# Patient Record
(demographics unavailable — no encounter records)

---

## 2017-07-31 NOTE — EMERGENCY DEPARTMENT REPORT
ED Fall HPI





- General


Chief Complaint: Extremity Injury, Upper


Stated Complaint: RIGHT SHOULDER PAIN


Time Seen by Provider: 07/31/17 20:42


Source: patient


Mode of arrival: Ambulatory


Limitations: No Limitations





- History of Present Illness


MD Complaint: fall


-: This evening


Fall From: standing


When Fall Occurred: just prior to arrival


Fall Witnessed: yes, by bystander


Place Fall Occurred: other ([facility])


Loss of Consciousness: none


Symptoms Prior to Fall: none


Location - Extremities: Right: Shoulder


Severity: severe


Severity scale (0 -10): 9


Quality: sharp


Context: tripped/slipped


Associated Symptoms: denies: headache, neck pain, numbness, weakness, chest 

paint, shortness of breath, abdominal pain, hematuria, unable to walk, 

lightheaded, vertigo, confusion





- Related Data


 Home Medications











 Medication  Instructions  Recorded  Confirmed  Last Taken


 


ALBUTEROL Inhaler [ProAir HFA 1 inh PO DAILY PRN 07/31/17 07/31/17 Unknown





Inhaler]    


 


Cyclobenzaprine HCl [Flexeril 5 MG 5 mg PO DAILY 07/31/17 07/31/17 Unknown





TAB]    


 


FLUoxetine [PROzac] 20 mg PO QDAY 07/31/17 07/31/17 Unknown


 


Fluticasone [Flonase] 1 spray NS QDAY 07/31/17 07/31/17 Unknown


 


Furosemide [Lasix TAB] 40 mg PO QDAY 07/31/17 07/31/17 Unknown


 


Hydroxychloroquine [Plaquenil] 200 mg PO BID 07/31/17 07/31/17 Unknown


 


Ketoprofen 50 mg PO BID 07/31/17 07/31/17 Unknown


 


Loratadine [Claritin] 10 mg PO DAILY 07/31/17 07/31/17 Unknown


 


Metoprolol [Lopressor TAB] 50 mg PO BID 07/31/17 07/31/17 Unknown


 


Montelukast [Singulair] 10 mg PO QPM 07/31/17 07/31/17 Unknown


 


Omeprazole Magnesium [PriLOSEC Otc] 20 mg PO 3XW 07/31/17 07/31/17 Unknown


 


sulfaSALAzine [Azulfidine] 750 mg PO DAILY 07/31/17 07/31/17 Unknown











 Allergies











Allergy/AdvReac Type Severity Reaction Status Date / Time


 


lisinopril Allergy  Unknown Verified 07/31/17 19:31














ED Review of Systems


ROS: 


Stated complaint: RIGHT SHOULDER PAIN


Other details as noted in HPI





Comment: All other systems reviewed and negative


Constitutional: denies: chills, fever


ENT: denies: ear pain


Respiratory: denies: cough, shortness of breath


Cardiovascular: denies: chest pain, palpitations


Gastrointestinal: denies: abdominal pain, nausea, vomiting, hematemesis


Genitourinary: denies: hematuria


Skin: denies: lesions


Neurological: denies: headache, weakness, numbness, paresthesias, confusion, 

abnormal gait, vertigo





ED Past Medical Hx





- Past Medical History


Hx Hypertension: Yes


Hx Diabetes: Yes


Hx GERD: Yes


Hx Asthma: Yes


Additional medical history: sjogren's syndrome,sleep apnea with C-PAP,

polycystic Ovarian,benign breast disease,fibromyalgia,RA,cardiomyopathy,AFIB





- Surgical History


Additional Surgical History: tonsillectomy,umbilical hernia,carpal tunnel 

syndrome. FX front upper jaw,left wrist,pelvis,bilateral ft





- Social History


Smoking Status: Never Smoker


Substance Use Type: None





- Medications


Home Medications: 


 Home Medications











 Medication  Instructions  Recorded  Confirmed  Last Taken  Type


 


ALBUTEROL Inhaler [ProAir HFA 1 inh PO DAILY PRN 07/31/17 07/31/17 Unknown 

History





Inhaler]     


 


Cyclobenzaprine HCl [Flexeril 5 MG 5 mg PO DAILY 07/31/17 07/31/17 Unknown 

History





TAB]     


 


FLUoxetine [PROzac] 20 mg PO QDAY 07/31/17 07/31/17 Unknown History


 


Fluticasone [Flonase] 1 spray NS QDAY 07/31/17 07/31/17 Unknown History


 


Furosemide [Lasix TAB] 40 mg PO QDAY 07/31/17 07/31/17 Unknown History


 


Hydroxychloroquine [Plaquenil] 200 mg PO BID 07/31/17 07/31/17 Unknown History


 


Ketoprofen 50 mg PO BID 07/31/17 07/31/17 Unknown History


 


Loratadine [Claritin] 10 mg PO DAILY 07/31/17 07/31/17 Unknown History


 


Metoprolol [Lopressor TAB] 50 mg PO BID 07/31/17 07/31/17 Unknown History


 


Montelukast [Singulair] 10 mg PO QPM 07/31/17 07/31/17 Unknown History


 


Omeprazole Magnesium [PriLOSEC Otc] 20 mg PO 3XW 07/31/17 07/31/17 Unknown 

History


 


sulfaSALAzine [Azulfidine] 750 mg PO DAILY 07/31/17 07/31/17 Unknown History














ED Physical Exam





- General


Limitations: Physical Limitation


General appearance: alert, in distress (secondary to pain)





- Head


Head exam: Present: atraumatic, normal inspection





- Eye


Eye exam: Present: normal appearance


Pupils: Present: normal accommodation





- ENT


ENT exam: Present: normal exam





- Neck


Neck exam: Present: normal inspection.  Absent: tenderness, meningismus, full 

ROM





- Respiratory


Respiratory exam: Present: normal lung sounds bilaterally.  Absent: respiratory 

distress, wheezes, rhonchi





- Cardiovascular


Cardiovascular Exam: Present: regular rate, normal rhythm, normal heart sounds





- GI/Abdominal


GI/Abdominal exam: Present: soft.  Absent: distended, tenderness, guarding, 

rebound, rigid, mass, pulsatile mass





- Expanded Upper Extremity Exam


  ** Right


Shoulder Exam: Present: tenderness, dislocation.  Absent: full ROM, swelling, 

abrasion, deformity, erythema, tenderness over AC joint


Upper Arm exam: Present: normal inspection.  Absent: tenderness


Elbow exam: Present: normal inspection, full ROM.  Absent: tenderness, 

ecchymosis, deformity


Forearm Wrist exam: Present: full ROM.  Absent: tenderness, swelling, laceration

, deformity


Hand Wrist exam: Present: normal inspection, full ROM.  Absent: tenderness, 

swelling, laceration


Neuro motor exam: Present: wrist extension intact, thumb opposition intact, 

thumb IP flexion intact, thumb adduction intact, fingers 2-5 abduction intact


Vascular: Present: normal capillary refill.  Absent: vascular compromise





- Back Exam


Back exam: Present: full ROM.  Absent: CVA tenderness (R), CVA tenderness (L)





- Neurological Exam


Neurological exam: Present: alert, oriented X3, CN II-XII intact, normal gait.  

Absent: motor sensory deficit





- Skin


Skin exam: Present: warm, intact, normal color





ED Course


 Vital Signs











  07/31/17 07/31/17 07/31/17





  19:21 20:46 21:35


 


Temperature 99 F  


 


Temperature [   98.8 F





Post-Procedure]   


 


Temperature [   98.8 F





Pre-Procedure]   


 


Pulse Rate 84  


 


Pulse Rate [   89





Intra-Procedure   





]   


 


Pulse Rate [   86





Post-Procedure]   


 


Pulse Rate [Pre   95 H





-Procedure]   


 


Respiratory 18 18 





Rate   


 


Respiratory   18





Rate [Intra-   





Procedure]   


 


Respiratory   20





Rate [Post-   





Procedure]   


 


Respiratory   22





Rate [Pre-   





Procedure]   


 


Blood Pressure 152/63  


 


Blood Pressure   164/78





[Intra-   





Procedure]   


 


Blood Pressure   130/75





[Post-Procedure   





]   


 


Blood Pressure   170/72





[Pre-Procedure]   


 


O2 Sat by Pulse 99 99 





Oximetry   


 


O2 Sat by Pulse   99





Oximetry [   





Intra-Procedure   





]   


 


O2 Sat by Pulse   99





Oximetry [Post   





-Procedure]   


 


O2 Sat by Pulse   99





Oximetry [Pre-   





Procedure]   














- Reevaluation(s)


Reevaluation #1: 





07/31/17 21:21


Discussed with Dr. Foreman who advised to go a head and reduce the shoulder.


Reevaluation #2: 





07/31/17 23:08


discuss with Dr Michel for admission. NPO after midnight for surgery in AM per 

Dr Foreman





- Moderate Sedation


ASA Class: III


Mallampati Airway Score: 3


Preparation: cardiac monitor applied, pulse oximeter, capnometry used, 

supplemental O2 applied, reversal agents at bedside, suction/airway equipment 

at bedside, IV secured


Fentanyl: IV


Midazolam: IV


Midazolam Dose: 5


IV Etomidate Dose (mgs): 10


Complications: none


Patient Tolerated Procedure: well





- Orthopedic Fracture Reduction


  ** Fracture #1


Consent Obtained: emergent situation


Time Out Performed: Yes


Side: right


Fracture Reduction Location: humerus


Analgesia: moderate sedation


Technique: traction/counter-traction


Post-Reduction Neuro Exam: intact


Post-Reduction Vascular Exam: intact


Splint Applied: Yes


Patient Tolerated Procedure: well





- Orthopedic Joint Reduction


  ** Joint #1


Consent Obtained: emergent situation


Time Out Performed: Yes


Side: right


Joint Reduction Location: shoulder


Analgesia: moderate sedation


Shoulder Technique Used (if applicable): traction/counter-traction


Technique Used: traction/counter-traction, direct manipulation


Post-Reduction Neuro Exam: intact


Post-Reduction Vascular Exam: intact


Post Reduction X-Ray Obtained: Yes


Splint Applied: Yes


Patient Tolerated Procedure: well





ED Medical Decision Making





- Lab Data


Result diagrams: 


 07/31/17 22:44





 07/31/17 22:21


Critical care attestation.: 


If time is entered above; I have spent that time in minutes in the direct care 

of this critically ill patient, excluding procedure time.








ED Disposition


Clinical Impression: 


 Humeral fracture, Shoulder dislocation





Disposition: DC-[id number] OP ADMIT IP TO THIS HOSP


Is pt being admited?: Yes


Does the pt Need Aspirin: No


Condition: Stable


Referrals: 


PRIMARY CARE,MD [Primary Care Provider] - 3-5 Days

## 2017-07-31 NOTE — HISTORY AND PHYSICAL REPORT
History of Present Illness


Chief complaint: 


I fell and hurt my arm





History of present illness: 


61 YO Female ([employer]'s Witness) with HTN, DM, GERD, ASthma, MO, AB, 

Fibromyalgia, RA, PCOS, Sjogrens, A Fib presents to ED for evaluation. Pt 

states that she tripped and experienced a fall today landing on her right 

shoulder. Pt experienced pain immediately after fall and was unable to move her 

arm due to pain. Pain was 9/10, worse with movement, relieved minimally with 

non movement. Pt seen and evaluated in ED and found to have a dislocated Right 

shoulder, and was reduced under anesthesia. Pt denies fever, chills, CP, 

Palpitations, NVD, syncope, productive cough, or recent ill contacts.  











Past History


Past Medical History: diabetes, GERD, hypertension


Past Surgical History: hysterectomy, hernia repair, tonsillectomy, Other (

carpal tunnel)


Social history: single, other ([employer])


Family history: diabetes, hypertension





Medications and Allergies


 Allergies











Allergy/AdvReac Type Severity Reaction Status Date / Time


 


lisinopril Allergy  Unknown Verified 07/31/17 19:31











 Home Medications











 Medication  Instructions  Recorded  Confirmed  Last Taken  Type


 


ALBUTEROL Inhaler [ProAir HFA 1 inh PO DAILY PRN 07/31/17 07/31/17 Unknown 

History





Inhaler]     


 


Cyclobenzaprine HCl [Flexeril 5 MG 5 mg PO DAILY 07/31/17 07/31/17 Unknown 

History





TAB]     


 


FLUoxetine [PROzac] 20 mg PO QDAY 07/31/17 07/31/17 Unknown History


 


Fluticasone [Flonase] 1 spray NS QDAY 07/31/17 07/31/17 Unknown History


 


Furosemide [Lasix TAB] 40 mg PO QDAY 07/31/17 07/31/17 Unknown History


 


Hydroxychloroquine [Plaquenil] 200 mg PO BID 07/31/17 07/31/17 Unknown History


 


Ketoprofen 50 mg PO BID 07/31/17 07/31/17 Unknown History


 


Loratadine [Claritin] 10 mg PO DAILY 07/31/17 07/31/17 Unknown History


 


Metoprolol [Lopressor TAB] 50 mg PO BID 07/31/17 07/31/17 Unknown History


 


Montelukast [Singulair] 10 mg PO QPM 07/31/17 07/31/17 Unknown History


 


Omeprazole Magnesium [PriLOSEC Otc] 20 mg PO 3XW 07/31/17 07/31/17 Unknown 

History


 


sulfaSALAzine [Azulfidine] 750 mg PO DAILY 07/31/17 07/31/17 Unknown History











Active Meds: 


Active Medications





Midazolam HCl (Versed)  2 mg IV ONCE NR


   Stop: 08/01/17 21:39


   Last Admin: 07/31/17 21:45 Dose:  2 mg











Review of Systems


All systems: negative


Constitutional: other (fall), no weight loss


Ears, nose, mouth and throat: no ear pain


Breasts: no swelling


Cardiovascular: no chest pain


Respiratory: no cough


Gastrointestinal: no abdominal pain


Genitourinary Female: no flank pain


Rectal: no pain


Musculoskeletal: no neck stiffness


Integumentary: no rash


Neurological: no head injury


Psychiatric: no anxiety


Endocrine: no cold intolerance


Hematologic/Lymphatic: no easy bruising


Allergic/Immunologic: no urticaria





Exam





- Constitutional


Vitals: 


 











Temp Pulse Resp BP Pulse Ox


 


 98.8 F   95 H  18   170/72   99 


 


 07/31/17 21:35  07/31/17 21:35  07/31/17 23:20  07/31/17 21:35  07/31/17 21:35











General appearance: Present: mild distress, obese





- EENT


Eyes: Present: PERRL


ENT: hearing intact, clear oral mucosa





- Neck


Neck: Present: supple, normal ROM





- Respiratory


Respiratory effort: normal


Respiratory: bilateral: CTA





- Cardiovascular


Rhythm: regular


Heart Sounds: Present: S1 & S2.  Absent: rub, click





- Extremities


Extremities: pulses symmetrical, No edema


Peripheral Pulses: within normal limits





- Abdominal


General gastrointestinal: Present: soft, non-tender, non-distended, normal 

bowel sounds


Female genitourinary: Present: normal





- Integumentary


Integumentary: Present: clear, warm, dry





- Musculoskeletal


Musculoskeletal: gait normal, strength equal bilaterally





- Psychiatric


Psychiatric: appropriate mood/affect, intact judgment & insight





- Neurologic


Neurologic: CNII-XII intact, moves all extremities





Results





- Labs


CBC & Chem 7: 


 07/31/17 22:44





 07/31/17 22:21


Labs: 


 Abnormal lab results











  07/31/17 07/31/17 Range/Units





  22:21 22:44 


 


WBC   14.0 H  (4.5-11.0)  K/mm3


 


Mono % (Auto)   9.5 H  (0.0-7.3)  %


 


Mono #   1.3 H  (0.0-0.8)  K/mm3


 


Seg Neutrophils #   9.6 H  (1.8-7.7)  K/mm3


 


Carbon Dioxide  21 L   (22-30)  mmol/L


 


Creatinine  0.6 L   (0.7-1.2)  mg/dL


 


Glucose  193 H   ()  mg/dL














Assessment and Plan





- Patient Problems


(1) HTN (hypertension)


Current Visit: Yes   Status: Acute   


Qualifiers: 


   Hypertension type: H 


Plan to address problem: 


monitor bp q shift, 








(2) AB (obstructive sleep apnea)


Current Visit: Yes   Status: Acute   


Plan to address problem: 


supportive care, supplemental oxygen, NIPPV as clinically indicated. 








(3) Fibromyalgia


Current Visit: Yes   Status: Acute   


Plan to address problem: 


continue current therapy, supportive care. 








(4) Humeral fracture


Current Visit: Yes   Status: Acute   


Qualifiers: 


   Encounter type: E   Humerus Location: H   Fracture type: F   Fracture 

morphology: F   Fracture alignment: F   Salter-Morris Fracture Type: S   

Laterality: L   Fracture healing: F 


Plan to address problem: 


[place] consulted, pending surgical intervention in AM








(5) Shoulder dislocation


Current Visit: Yes   Status: Acute   


Qualifiers: 


   Encounter type: E   Laterality: L 


Plan to address problem: 


Ortho consulted, S/P shoulder reduction








(6) DVT prophylaxis


Current Visit: Yes   Status: Acute

## 2017-08-01 NOTE — ADMIT CRITERIA FORM
Admission Criteria Documentation: 








               MUSCULOSKELETAL DISEASE GRG





Clinical Indications for Admission to Inpatient Care





                                                            (Place 'X' for any 

and all applicable criteria):





Hospital admission is needed for appropriate care of the patient because of 1 

or more of the following:





[X ]I.    Fracture, dislocation, or other musculoskeletal injury requiring 

inpatient care(medical) as indicated 


         by 1 or more of the following(4)(5)(6)(7)


         [ ]a)  Vertebral fracture requiring observation for instability or 

neurologic compromise (8)


         [ ]b)  Compartment syndrome (proven or cannot be ruled out during 

observation level of care) (9)


         [ ]c)  Limb-threatening injury


         [ ]d)  Major injury requiring inpatient stabilization such as traction 

initiation or external fixation 


                 before internal fixation or closure of complex or open fracture


         [X ]e)  Major injury requiring inpatient treatment after emergency or 

observation level care (as appropriate)


         [ ]f)   Severe pain requiring acute inpatient management


         [ ]g)  Injury with suspicion of abuse or neglect (eg., child, 

dependent elderly)





[ ]II.    Newly diagnosed or suspected bone, joint, or orthopedic device 

infection (e.g., osteomyelitis, septic arthritis) 


          needing 1 or more of the following(1)(2)(3)


          [ ]a)   IV antibiotics that cannot be initiated in other than 

inpatient setting (e.g., patient too unstable or


                   home infusion not available)


          [ ]b)   Device removal or replacement


          [ ]c)   Bone or soft tissue debridement


          [ ]d)   Joint drainage (drain placement or repetitive aspirations)


[ ]III.    Severe rheumatologic disease (e.g., systemic lupus erythematosus, 

rheumatoid arthritis) with complications


          or comorbidities (Also use Optimal Recovery Care Criteria or General 

Recovery Criteria as appropriate on the 


          basis of predominant condition), including 1 or more of the following(

10)(11)(12)(13)


          [ ]a)  Severe infection (e.g., CNS infection, sepsis) (14)


          [ ]b)  Respiratory complications, including 1 or more of the following

:


                  [ ]i)     Pleural effusion with respiratory compromise      


                  [ ]ii)    Pulmonary hypertension with congestive failure 


                  [ ]iii)   Respiratory failure


                  [ ]iv)   Pulmonary hemorrhage (15)


           [ ]c) Hematologic disease, including 1 or more of the following:


                 [ ]i)     Coagulopathy with bleeding        


                 [ ]ii)    Thrombosis with hypercoagulable state      


                 [ ]iii)   Thrombotic thrombocytopenic purpura 


           [ ]d) Cerebritis with seizures, psychosis, or other severe 

abnormalities


           [ ]e) Vertebral destruction with monitoring needed for cervical 

myelopathy& possible respiratory compromise


           [ ]f)  Exacerbation that requires inpatient treatment (e.g., 

intravenous immunosuppression) (16)


           [ ]g) Acute renal failure


           [ ]h) Cerebritis with seizures, psychosis, Altered mental status, or 

other neurologic abnormalities


           [ ]i) Pericardial effusion with tamponade


           [ ]j) Vertebral destruction, with monitoring needed for cervical 

myelopathy and possible respiratory compromise





[ ]IV. Severe vasculitis with complications or comorbidities (Also use Optimal 

Recovery Care Criteria 


        General Recovery Criteria as appropriate on the basis of predominant 

condition), including


        1 or more of the following(11)(12)(17)(18)(19)(20)


        [ ]a)   Exacerbation that requires inpatient treatment (e.g., 

intravenous immunosuppression) (19)(21)


        [ ]b)    Pulmonary hemorrhage (15)                                     

                                  


        [ ]c)   CNS vasculitis with seizures, psychosis, Altered mental status 

that is severe or persistent, or other severe abnormalities (22)


        [ ]d)  Cerebral infarction                                             


        [ ]e)  Gastrointestinal ischemia 


        [ ]f)   Gangrene or threatened amputation


        [ ]g)  Renal failure (16)   


        [ ]h) Other significant complications of vasculitis ( eg., tissue or 

organ ischemia, organ dysfunction )





[ ]V.  Severe myopathy as indicated by 1 or more of the following (28)(29)


        [ ]a)  New onset of airway compromise or inability to swallow


        [ ]b)  Respiratory deterioration with observation needed for impending 

respiratory failure


        [ ]c)  Exacerbation that requires inpatient treatment (e.g., 

intravenous immunosuppression) 





[ ]VI. Severe crystal gout (arthropathy) indicated by 1 or more of the 

following (23)(24)


        [ ]a)  Severe pain requiring acute inpatient management


        [ ]b)  Exacerbation that requires inpatient treatment (e.g., 

intravenous treatment) 





[ ]VII.Rhabdomyolysis and 1 or more of the following (25)(26)(27)


        [ ]a)  Acute renal failure


        [ ]b)  Need for intravenous hydration after emergency or observation 

level care (as appropriate)


        [ ]c)  Inability to maintain oral hydration


        [ ]d)  Change in mental status


        [ ]e)  Electrolyte abnormality that remains after emergency or 

observation level care (as appropriate) 





[ ]VIII Post amputation complication, as indicated by ANY ONE of the following 


         [ ]a) Infection


         [ ]b) Dehiscence


         [ ]c) Myodesis failure        





[ ]IX. Severe pain requiring acute inpatient management due to musculoskeletal 

condition


       


[ ]X.  Musculoskeletal Disease and ALL of the following:


        [ ]a)  Symptom or finding for which emergency and observation care have 

failed or are not considered 


                appropriate (Use General Criteria: Observation Care as 

appropriate)


        [ ]b)  Presence of ANY ONE of the following


               [ ]i)   A General Admission Criteria    


               [ ]ii)  A Pediatric General Admission Criteria 











The original [facility] [employer] content created by [facility] 

[employer] has been revised. 


The portions of the content which have been revised are identified through the 

use of italic text or in bold, and [facility] 


has neither reviewed nor approved the modified material. All other unmodified 

content is copyright  [facility] [employer].





Please see references footnoted in the original [facility] edition 

2016


Admission Criteria Met: Yes

## 2017-08-01 NOTE — XRAY REPORT
PORTABLE CHEST



INDICATION: Chest pain.



COMPARISON: 2/28/2011



FINDINGS: Portable, frontal chest radiograph demonstrates slight motion 

artifact with grossly stable cardiomediastinal silhouette and increased 

bronchovascular prominence centrally, given slight difference in 

inspiration.  No large pleural effusions or CHF.  EKG leads. Grossly 

stable bones.



CONCLUSION: Findings, as above.



Thank you for the opportunity to participate in this patient's care.

## 2017-08-01 NOTE — XRAY REPORT
RIGHT SHOULDER, 3 views:



History: Pain.



A comminuted fracture involving the right humeral head and neck is 

identified. No additional fracture is appreciated. Large joint effusion 

is suspected.





IMPRESSION: Right humeral head and neck fracture. Joint effusion.

## 2017-08-01 NOTE — ANESTHESIA CONSULTATION
Anesthesia Consult and Med Hx


Date of service: 08/01/17





- Airway


Anesthetic Teeth Evaluation: Good


ROM Head & Neck: Adequate


Mental/Hyoid Distance: Adequate


Mallampati Class: Class II


Intubation Access Assessment: Good





- Pulmonary Exam


CTA: Yes





- Cardiac Exam


Cardiac Exam: RRR





- Pre-Operative Health Status


ASA Pre-Surgery Classification: ASA3


Proposed Anesthetic Plan: General


Nerve Block: IS





- Pulmonary


Hx Asthma: Yes


COPD: No


Hx Pneumonia: Yes





- Cardiovascular System


Hx Hypertension: Yes





- Central Nervous System


Hx Psychiatric Problems: No





- Endocrine


Hx End Stage Renal Disease: No


Hx Non-Insulin Dependent Diabetes: Yes





- Hematic


Hx Anemia: No


Hx Sickle Cell Disease: No





- Other Systems


Hx Alcohol Use: Yes


Hx Substance Use: No


Hx Cancer: No


Hx Obesity: Yes

## 2017-08-01 NOTE — PROCEDURE NOTE
Date of procedure: 08/01/17


Pre-op diagnosis: fracture dislocation right shoulder


Post-op diagnosis: same


Procedure: 





Procedure- close reduction and insertion of intramedullary nail right humerus





Indications - exterior-year-old female who complained of right shoulder pain 

after a slip and fall injury, she was seen in the emergency room where x-rays 

were taken revealing a fracture dislocation.  Multiple attempts at closed 

reduction were unsuccessful therefore patient was admitted and being taken now 

to the or for the above procedure





Procedure


Patient was brought to the OR after being given a scalene nerve block in 

preoperative holding she was placed on the OR table in a supine position 

followed and induction and intubation by anesthesia patient was placed in a 

beachchair position with the right upper extremity draped and suspended from 

the table.  The right shoulder and arm was then prepped and draped in the usual 

sterile manner.  Timeout procedure was done to identify the patient and the.  

Using C-arm fluoroscopy a guidewire placed over the intended insertion site a 

stab wound was made on the top of the shoulder and was carried down through 

skin and subcutaneous and through the deltoid and supraspinatus tendon 

guidewire was then advanced further into the canal using C-arm fluoroscopy 

asked to guidewire was then overreamed a short IM nail was then inserted 

antegrade into the proximal humerus again under C-arm fluoroscopy 2 locking 

screws were placed proximally through the greater tuberosity and lesser 

tuberosities a third screw noted at the kickstand was used to stabilize the 

proximal and distal fragments this was then followed by 2 distally locking 

screws in AP and lateral image was obtained and showed good reduction of the 

fracture as well as the anterior dislocation following this the wound was 

copiously irrigated the incision was closed starting with the rotator cuff 

tendon deltoid fascia subcutaneous tissue and finally the skin the additional 

stab wounds for the proximal and distal locking screws were also irrigated and 

closed routine postoperative dressings were applied the patient tolerated the 

procedure there were no complications


Anesthesia: GETA


Surgeon: KEERTHI TINAJERO (first assistant Viral Gamboa)


Estimated blood loss: 50-100ml


Pathology: none


Condition: stable


Disposition: PACU

## 2017-08-01 NOTE — XRAY REPORT
RIGHT SHOULDER, ONE VIEW



History: Postreduction film.



Findings: Position and alignment of the right humeral head is unchanged 

since earlier today at 1943 hrs. There is inferior subluxation of the 

humeral head secondary to a large joint effusion/hemarthrosis but no 

dislocation. Comminuted fracture of the proximal humerus is unchanged.



Impression: No change.

## 2017-08-01 NOTE — PROGRESS NOTE
Assessment and Plan


Assessment and plan: 


61 YO Female (Jehova's Witness) with HTN, DM, GERD, ASthma, MO, AB, 

Fibromyalgia, RA, PCOS, Sjogrens, A Fib presents to ED for evaluation. Pt 

states that she tripped and experienced a fall today landing on her right 

shoulder. Pt experienced pain immediately after fall and was unable to move her 

arm due to pain. Pain was 9/10, worse with movement, relieved minimally with 

non movement. Pt seen and evaluated in ED and found to have a dislocated Right 

shoulder, and was reduced under anesthesia. Pt denies fever, chills, CP, 

Palpitations, NVD, syncope, productive cough, or recent ill contacts.  





* Intermittent fracture of the proximal humerus with inferior subluxation of 

the humeral head


 * Orthopedic surgeon following patient planned for surgery today.  Continue 

pain control.


* Hypertension


 * Continue home medication.


* Diabetes Mellitus


 * Patient on metformin at home, will start on Accu-Cheks before meals and at 

bedtime


* Fibromyalgia


 * Continue supportive care


* Morbid Obesity


 * Discussed with patient weight management options she verbalizes understanding


* Leukocytosis-resolved


* Obstructive sleep apnea


 * CPAP daily at bedtime


* DVT and GI prophylaxis


* Plan care discussed with the patient in detail











History


Interval history: 


Patient seen and examined this morning remains with pain on the right shoulder 

which is currently in a sling.  Patient was admitted with right upper extremity 

pain following a fall was noted to have a comminuted fracture of the proximal 

humerus with inferior subluxation of the humeral head.  Patient describes as 5/

10 in intensity was with movement.  Denies any fever, nausea, vomiting or 

diarrhea.








Hospitalist Physical





- Physical exam


Narrative exam: 


 VITAL SIGNS:  Reviewed.    


GENERAL:  The patient appeared well nourished and normally developed.  Morbidly 

obese, Vital signs as documented.


HEAD:  No signs of head trauma.


EYES:  Pupils are equal.  Extraocular motions intact.  


EARS:  Hearing grossly intact.


MOUTH:  Oropharynx is normal. 


NECK:  No adenopathy, no JVD.   


CHEST:  Chest with clear breath sounds bilaterally.  No wheezes, rales, or 

rhonchi.  


CARDIAC:  Regular rate and rhythm.  S1 and S2, without murmurs, gallops, or 

rubs.


VASCULAR:  No Edema.  Peripheral pulses normal and equal in all extremities.


ABDOMEN:  Soft, without detectable tenderness.  No sign of distention.  No   

rebound or guarding, and no masses palpated.   Bowel Sounds normal.


MUSCULOSKELETAL: Limited range of motion right upper extremity in a sling. 

Extremities without clubbing, cyanosis or edema.  


NEUROLOGIC EXAM:  Alert and oriented x 3.  No focal sensory or strength 

deficits.   Speech normal.  Follows commands.


PSYCHIATRIC:  Mood normal.


SKIN:  No rash or lesions.














- Constitutional


Vitals: 


 











Temp Pulse Resp BP Pulse Ox


 


 99.2 F   81   19   125/77   97 


 


 08/01/17 07:00  08/01/17 07:00  08/01/17 07:00  08/01/17 07:00  08/01/17 02:00











General appearance: Present: mild distress, obese





Results





- Labs


CBC & Chem 7: 


 08/01/17 12:29





 07/31/17 22:21


Labs: 


 Laboratory Last Values











WBC  10.9 K/mm3 (4.5-11.0)   08/01/17  12:29    


 


RBC  4.19 M/mm3 (3.65-5.03)   08/01/17  12:29    


 


Hgb  11.8 gm/dl (10.1-14.3)   08/01/17  12:29    


 


Hct  35.0 % (30.3-42.9)   08/01/17  12:29    


 


MCV  84 fl (79-97)   08/01/17  12:29    


 


MCH  28 pg (28-32)   08/01/17  12:29    


 


MCHC  34 % (30-34)   08/01/17  12:29    


 


RDW  14.7 % (13.2-15.2)   08/01/17  12:29    


 


Plt Count  222 K/mm3 (140-440)   08/01/17  12:29    


 


Lymph % (Auto)  16.9 % (13.4-35.0)   08/01/17  12:29    


 


Mono % (Auto)  12.2 % (0.0-7.3)  H  08/01/17  12:29    


 


Eos % (Auto)  1.3 % (0.0-4.3)   08/01/17  12:29    


 


Baso % (Auto)  0.5 % (0.0-1.8)   08/01/17  12:29    


 


Lymph #  1.8 K/mm3 (1.2-5.4)   08/01/17  12:29    


 


Mono #  1.3 K/mm3 (0.0-0.8)  H  08/01/17  12:29    


 


Eos #  0.1 K/mm3 (0.0-0.4)   08/01/17  12:29    


 


Baso #  0.1 K/mm3 (0.0-0.1)   08/01/17  12:29    


 


Seg Neutrophils %  69.1 % (40.0-70.0)   08/01/17  12:29    


 


Seg Neutrophils #  7.5 K/mm3 (1.8-7.7)   08/01/17  12:29    


 


PT  13.1 Sec. (12.2-14.9)   07/31/17  00:10    


 


INR  1.00  (0.87-1.13)   07/31/17  00:10    


 


APTT  24.3 Sec. (24.2-36.6)   07/31/17  00:10    


 


Sodium  141 mmol/L (137-145)   07/31/17  22:21    


 


Potassium  4.9 mmol/L (3.6-5.0)   07/31/17  22:21    


 


Chloride  102.8 mmol/L ()   07/31/17  22:21    


 


Carbon Dioxide  21 mmol/L (22-30)  L  07/31/17  22:21    


 


Anion Gap  22 mmol/L  07/31/17  22:21    


 


BUN  13 mg/dL (7-17)   07/31/17  22:21    


 


Creatinine  0.6 mg/dL (0.7-1.2)  L  07/31/17  22:21    


 


Estimated GFR  > 60 ml/min  07/31/17  22:21    


 


BUN/Creatinine Ratio  21.66 %  07/31/17  22:21    


 


Glucose  193 mg/dL ()  H  07/31/17  22:21    


 


POC Glucose  169  ()  H  08/01/17  11:38    


 


Calcium  9.1 mg/dL (8.4-10.2)   07/31/17  22:21    


 


Total Bilirubin  0.20 mg/dL (0.1-1.2)   07/31/17  22:21    


 


AST  38 units/L (5-40)   07/31/17  22:21    


 


ALT  27 units/L (7-56)   07/31/17  22:21    


 


Alkaline Phosphatase  55 units/L ()   07/31/17  22:21    


 


Total Protein  6.4 g/dL (6.3-8.2)   07/31/17  22:21    


 


Albumin  4.1 g/dL (3.9-5)   07/31/17  22:21    


 


Albumin/Globulin Ratio  1.8 %  07/31/17  22:21    














- Imaging and Cardiology


Chest x-ray: image reviewed (no acute pathology noted)

## 2017-08-02 NOTE — XRAY REPORT
RIGHT SHOULDER, ONE VIEW



History: Right shoulder fracture, intraoperative film.



Findings: 2 AP fluoroscopic images of the right shoulder were obtained 

during surgery. The  image demonstrates a comminuted fracture of 

the right humeral head and neck. The second image demonstrates internal 

fixation of the fracture with metal stanton and screws. Alignment is 

near-anatomic. Please correlate with the procedural report by Dr. Whitfield if needed.



Impression: Open reduction and internal fixation of a proximal right 

humerus fracture.

## 2017-08-02 NOTE — XRAY REPORT
RIGHT SHOULDER, 2 VIEWS



History: Right shoulder film, postoperative films. Pain.



Findings: AP and internally rotated films of the right shoulder 

demonstrate an internally fixated proximal humerus fracture. Alignment 

is near-anatomic.



Impression: Internal fixation of a proximal right humerus fracture.

## 2017-08-02 NOTE — PROGRESS NOTE
Assessment and Plan


Assessment and plan: 


61 YO Female (Jehova's Witness) with HTN, DM, GERD, ASthma, MO, AB, 

Fibromyalgia, RA, PCOS, Sjogrens, A Fib presents to ED for evaluation. Pt 

states that she tripped and experienced a fall today landing on her right 

shoulder. Pt experienced pain immediately after fall and was unable to move her 

arm due to pain. Pain was 9/10, worse with movement, relieved minimally with 

non movement. Pt seen and evaluated in ED and found to have a dislocated Right 

shoulder, and was reduced under anesthesia. Pt denies fever, chills, CP, 

Palpitations, NVD, syncope, productive cough, or recent ill contacts.  





* Acute respiratory distress possible secondary to obesity hypoventilation 

syndrome 


 * CT of the chest rule out PE given a one-time dose of steroids, start patient 

on DuoNeb scheduled in addition to when necessary


* Intermittent fracture of the proximal humerus with inferior subluxation of 

the humeral head


 * Status post close reduction and insertion of intramedullary nail right 

humerus.  Continue pain control.


 * Incentive spirometer


* Hypertension


 * Continue home medication.


* Diabetes Mellitus


 * Patient on metformin at home, will start on Accu-Cheks before meals and at 

bedtime


* Fibromyalgia


 * Continue supportive care


* Morbid Obesity


 * Discussed with patient weight management options she verbalizes understanding


* Leukocytosis-resolved


* Obstructive sleep apnea


 * CPAP daily at bedtime


* DVT and GI prophylaxis


* Plan care discussed with the patient in detail


* Anticipate discharge in a.m.











History


Interval history: 


Patient seen and examined, proceeded to have right shoulder surgery yesterday.  

This morning having some shortness of breath.  Although improving the time I 

saw her.  She denies any chest pain.  No other adverse event reported to me





Hospitalist Physical





- Physical exam


Narrative exam: 


 VITAL SIGNS:  Reviewed.    


GENERAL:  The patient appeared well nourished and normally developed.  Morbidly 

obese, Vital signs as documented.


HEAD:  No signs of head trauma.


EYES:  Pupils are equal.  Extraocular motions intact.  


EARS:  Hearing grossly intact.


MOUTH:  Oropharynx is normal. 


NECK:  No adenopathy, no JVD.   


CHEST:  Chest with diminished breath sounds bilaterally.  No wheezes, rales, or 

rhonchi.  Mild increase in work of breathing


CARDIAC:  Regular rate and rhythm.  S1 and S2, without murmurs, gallops, or 

rubs.


VASCULAR:  No Edema.  Peripheral pulses normal and equal in all extremities.


ABDOMEN:  Soft, without detectable tenderness.  No sign of distention.  No   

rebound or guarding, and no masses palpated.   Bowel Sounds normal.


MUSCULOSKELETAL: Limited range of motion right upper extremity in a sling. 

Extremities without clubbing, cyanosis or edema.  


NEUROLOGIC EXAM:  Alert and oriented x 3.  No focal sensory or strength 

deficits.   Speech normal.  Follows commands.


PSYCHIATRIC:  Mood normal.


SKIN:  No rash or lesions.














- Constitutional


Vitals: 


 











Temp Pulse Resp BP Pulse Ox


 


 98 F   80   20   121/68   95 


 


 08/02/17 08:00  08/02/17 08:00  08/02/17 08:00  08/02/17 08:00  08/02/17 08:00











General appearance: Present: mild distress, obese





Results





- Labs


CBC & Chem 7: 


 08/01/17 12:29





 07/31/17 22:21


Labs: 


 Laboratory Last Values











WBC  10.9 K/mm3 (4.5-11.0)   08/01/17  12:29    


 


RBC  4.19 M/mm3 (3.65-5.03)   08/01/17  12:29    


 


Hgb  11.8 gm/dl (10.1-14.3)   08/01/17  12:29    


 


Hct  35.0 % (30.3-42.9)   08/01/17  12:29    


 


MCV  84 fl (79-97)   08/01/17  12:29    


 


MCH  28 pg (28-32)   08/01/17  12:29    


 


MCHC  34 % (30-34)   08/01/17  12:29    


 


RDW  14.7 % (13.2-15.2)   08/01/17  12:29    


 


Plt Count  222 K/mm3 (140-440)   08/01/17  12:29    


 


Lymph % (Auto)  16.9 % (13.4-35.0)   08/01/17  12:29    


 


Mono % (Auto)  12.2 % (0.0-7.3)  H  08/01/17  12:29    


 


Eos % (Auto)  1.3 % (0.0-4.3)   08/01/17  12:29    


 


Baso % (Auto)  0.5 % (0.0-1.8)   08/01/17  12:29    


 


Lymph #  1.8 K/mm3 (1.2-5.4)   08/01/17  12:29    


 


Mono #  1.3 K/mm3 (0.0-0.8)  H  08/01/17  12:29    


 


Eos #  0.1 K/mm3 (0.0-0.4)   08/01/17  12:29    


 


Baso #  0.1 K/mm3 (0.0-0.1)   08/01/17  12:29    


 


Seg Neutrophils %  69.1 % (40.0-70.0)   08/01/17  12:29    


 


Seg Neutrophils #  7.5 K/mm3 (1.8-7.7)   08/01/17  12:29    


 


PT  13.1 Sec. (12.2-14.9)   07/31/17  00:10    


 


INR  1.00  (0.87-1.13)   07/31/17  00:10    


 


APTT  24.3 Sec. (24.2-36.6)   07/31/17  00:10    


 


D-Dimer  2719.62 ng/mlDDU (0-234)  H  08/01/17  21:13    


 


Sodium  141 mmol/L (137-145)   07/31/17  22:21    


 


Potassium  4.9 mmol/L (3.6-5.0)   07/31/17  22:21    


 


Chloride  102.8 mmol/L ()   07/31/17  22:21    


 


Carbon Dioxide  21 mmol/L (22-30)  L  07/31/17  22:21    


 


Anion Gap  22 mmol/L  07/31/17  22:21    


 


BUN  13 mg/dL (7-17)   07/31/17  22:21    


 


Creatinine  0.6 mg/dL (0.7-1.2)  L  07/31/17  22:21    


 


Estimated GFR  > 60 ml/min  07/31/17  22:21    


 


BUN/Creatinine Ratio  21.66 %  07/31/17  22:21    


 


Glucose  193 mg/dL ()  H  07/31/17  22:21    


 


POC Glucose  173  ()  H  08/02/17  12:01    


 


Calcium  9.1 mg/dL (8.4-10.2)   07/31/17  22:21    


 


Magnesium  1.60 mg/dL (1.7-2.3)  L  08/01/17  21:13    


 


Total Bilirubin  0.20 mg/dL (0.1-1.2)   07/31/17  22:21    


 


AST  38 units/L (5-40)   07/31/17  22:21    


 


ALT  27 units/L (7-56)   07/31/17  22:21    


 


Alkaline Phosphatase  55 units/L ()   07/31/17  22:21    


 


Troponin T  < 0.010 ng/mL (0.00-0.029)   08/01/17  21:13    


 


Total Protein  6.4 g/dL (6.3-8.2)   07/31/17  22:21    


 


Albumin  4.1 g/dL (3.9-5)   07/31/17  22:21    


 


Albumin/Globulin Ratio  1.8 %  07/31/17  22:21    














- Imaging and Cardiology


CT scan - chest: pending

## 2017-08-03 NOTE — DISCHARGE SUMMARY
Providers





- Providers


Date of Admission: 


07/31/17 23:38





Date of discharge: 08/03/17


Attending physician: 


MARYCHUY PULIDO MD





 





08/01/17 20:23


Physical Therapy Evaluation and Treat [CONS] Routine 


   Comment: pendulum exercises right shoulder


   Reason For Exam: postop evaluation











Primary care physician: 


PRIMARY CARE MD








Hospitalization


Reason for admission: humerus fracture


Condition: Stable


Hospital course: 


61 YO Female (Jehova's Witness) with HTN, DM, GERD, ASthma, MO, AB, 

Fibromyalgia, RA, PCOS, Sjogrens, A Fib presents to ED for evaluation. Pt 

states that she tripped and experienced a fall today landing on her right 

shoulder. Pt experienced pain immediately after fall and was unable to move her 

arm due to pain. Pain was 9/10, worse with movement, relieved minimally with 

non movement. Pt seen and evaluated in ED and found to have a dislocated Right 

shoulder, and was reduced under anesthesia. Pt denies fever, chills, CP, 

Palpitations, NVD, syncope, productive cough, or recent ill contacts.  





* Acute respiratory distress possible secondary to obesity hypoventilation 

syndrome 


 * Patient could not do VQ scan all CTA due to weight and recent surgery.  Was 

started on presumptive treatment for PE due to elevated d-dimer.   Side effect 

of medication discussed with the patient detail.


 * Home on Discharge. 


* Intermittent fracture of the proximal humerus with inferior subluxation of 

the humeral head


 * Status post close reduction and insertion of intramedullary nail right 

humerus.  OT outpatient


 * Incentive spirometer


* Hypertension


 * Continue home medication.


* Diabetes Mellitus


 * Patient on metformin at home, resume PO


* Fibromyalgia


 * Continue supportive care


* Morbid Obesity


 * Discussed with patient weight management options she verbalizes understanding


* Leukocytosis-resolved


* Obstructive sleep apnea


 * CPAP daily at bedtime





Patient advised to follow with Orthopedic surgery and PCP also with P[ulmonary 

on discharge.  








Disposition: [id number]/TX-06 HOME UNDER HOME [facility]


Time spent for discharge: 35 MINS





Core Measure Documentation





- Palliative Care


Palliative Care/ Comfort Measures: Not Applicable





- Core Measures


Any of the following diagnoses?: none





- VTE Discharge Requirements


Deep Vein Thrombosis/Pulmonary Embolism Present on Admission: No





Exam





- Physical Exam


Narrative exam: 


 VITAL SIGNS:  Reviewed.    


GENERAL:  The patient appeared well nourished and normally developed.  Morbidly 

obese, Vital signs as documented.


HEAD:  No signs of head trauma.


EYES:  Pupils are equal.  Extraocular motions intact.  


EARS:  Hearing grossly intact.


MOUTH:  Oropharynx is normal. 


NECK:  No adenopathy, no JVD.   


CHEST:  Chest with diminished breath sounds bilaterally.  No wheezes, rales, or 

rhonchi. 


CARDIAC:  Regular rate and rhythm.  S1 and S2, without murmurs, gallops, or 

rubs.


VASCULAR:  No Edema.  Peripheral pulses normal and equal in all extremities.


ABDOMEN:  Soft, without detectable tenderness.  No sign of distention.  No   

rebound or guarding, and no masses palpated.   Bowel Sounds normal.


MUSCULOSKELETAL: Limited range of motion right upper extremity in a sling. 

Extremities without clubbing, cyanosis or edema.  


NEUROLOGIC EXAM:  Alert and oriented x 3.  No focal sensory or strength 

deficits.   Speech normal.  Follows commands.


PSYCHIATRIC:  Mood normal.


SKIN:  No rash or lesions.














- Constitutional


Vitals: 


 











Temp Pulse Resp BP Pulse Ox


 


 99.8 F H  97 H  18   141/67   91 


 


 08/03/17 08:00  08/03/17 09:30  08/03/17 09:30  08/03/17 08:00  08/03/17 09:18














Plan


Activity: advance as tolerated, fall precautions


Diet: low fat, diabetic


Special Instructions: record daily weights, record daily BP diary, physical 

therapy, occupational therapy, home oxygen via (2L/M NC)


Additional Instructions: Do no return to work untill cleared by PCP in 1 week


Follow up with: 


PRIMARY CARE,MD [Primary Care Provider] - 3-5 Days


KEERTHI TINAJERO MD [Staff Physician] - 7 Days


Prescriptions: 


Apixaban [Eliquis] 10 mg PO Q12HR 7 Days


Apixaban [Eliquis] 5 mg PO BID 30 Days


Famotidine [Pepcid] 20 mg PO BID #30 tablet


oxyCODONE /ACETAMINOPHEN [Percocet 5/325 mg] 1 tab PO Q6H PRN 3 Days


 PRN Reason: Pain, Moderate (4-6)

## 2017-08-04 NOTE — VASCULAR LAB REPORT
LOWER EXTREMITY VENOUS DUPLEX:



REASON FOR EXAM: Pain and swelling of the lower extremities.



COMMENTS ON THE RIGHT:

All veins visualized are freely compressible without evidence of

internal echogenicity.  Flow is spontaneous and phasic throughout.

The soft tissue density is consistent with Baker's cyst.

COMMENTS ON THE LEFT:

All veins visualized are freely compressible without evidence of

internal echogenicity.  Flow is spontaneous and phasic throughout.



IMPRESSION:

No evidence of acute or chronic deep venous thrombosis in either

lower extremity.

## 2017-08-09 NOTE — XRAY REPORT
RIGHT SHOULDER, 3 VIEWS



History: Pain.



Findings: The internally fixated proximal right humerus fracture is 

unchanged in position and alignment since the intraoperative films 

dated 8/1/17. No calcified callus has developed. No new acute process 

is noted. Degenerative findings are stable.



Impression: No change.